# Patient Record
Sex: FEMALE | Race: WHITE | NOT HISPANIC OR LATINO | Employment: FULL TIME | URBAN - METROPOLITAN AREA
[De-identification: names, ages, dates, MRNs, and addresses within clinical notes are randomized per-mention and may not be internally consistent; named-entity substitution may affect disease eponyms.]

---

## 2023-11-10 ENCOUNTER — APPOINTMENT (OUTPATIENT)
Dept: CARDIOLOGY | Facility: HOSPITAL | Age: 68
End: 2023-11-10
Payer: COMMERCIAL

## 2023-11-10 ENCOUNTER — HOSPITAL ENCOUNTER (EMERGENCY)
Facility: HOSPITAL | Age: 68
Discharge: HOME | End: 2023-11-10
Attending: EMERGENCY MEDICINE
Payer: COMMERCIAL

## 2023-11-10 VITALS
SYSTOLIC BLOOD PRESSURE: 125 MMHG | DIASTOLIC BLOOD PRESSURE: 69 MMHG | OXYGEN SATURATION: 98 % | TEMPERATURE: 98.6 F | HEIGHT: 62 IN | BODY MASS INDEX: 36.07 KG/M2 | WEIGHT: 196 LBS | RESPIRATION RATE: 16 BRPM | HEART RATE: 74 BPM

## 2023-11-10 DIAGNOSIS — M79.89 LEFT LEG SWELLING: ICD-10-CM

## 2023-11-10 DIAGNOSIS — Z98.890 POST-OPERATIVE STATE: Primary | ICD-10-CM

## 2023-11-10 LAB
ANION GAP SERPL CALCULATED.3IONS-SCNC: 11.2 MMOL/L (ref 5–15)
BASOPHILS # BLD AUTO: 0.04 10*3/MM3 (ref 0–0.2)
BASOPHILS NFR BLD AUTO: 0.5 % (ref 0–1.5)
BUN SERPL-MCNC: 20 MG/DL (ref 8–23)
BUN/CREAT SERPL: 16.3 (ref 7–25)
CALCIUM SPEC-SCNC: 9.5 MG/DL (ref 8.6–10.5)
CHLORIDE SERPL-SCNC: 104 MMOL/L (ref 98–107)
CO2 SERPL-SCNC: 22.8 MMOL/L (ref 22–29)
CREAT SERPL-MCNC: 1.23 MG/DL (ref 0.57–1)
CRP SERPL-MCNC: 6.66 MG/DL (ref 0–0.5)
D-LACTATE SERPL-SCNC: 0.9 MMOL/L (ref 0.5–2)
DEPRECATED RDW RBC AUTO: 41 FL (ref 37–54)
EGFRCR SERPLBLD CKD-EPI 2021: 48.9 ML/MIN/1.73
EOSINOPHIL # BLD AUTO: 0.35 10*3/MM3 (ref 0–0.4)
EOSINOPHIL NFR BLD AUTO: 4.3 % (ref 0.3–6.2)
ERYTHROCYTE [DISTWIDTH] IN BLOOD BY AUTOMATED COUNT: 13 % (ref 12.3–15.4)
ERYTHROCYTE [SEDIMENTATION RATE] IN BLOOD: 65 MM/HR (ref 0–30)
GLUCOSE SERPL-MCNC: 93 MG/DL (ref 65–99)
HCT VFR BLD AUTO: 34.2 % (ref 34–46.6)
HGB BLD-MCNC: 11.5 G/DL (ref 12–15.9)
IMM GRANULOCYTES # BLD AUTO: 0.03 10*3/MM3 (ref 0–0.05)
IMM GRANULOCYTES NFR BLD AUTO: 0.4 % (ref 0–0.5)
LYMPHOCYTES # BLD AUTO: 1.78 10*3/MM3 (ref 0.7–3.1)
LYMPHOCYTES NFR BLD AUTO: 21.9 % (ref 19.6–45.3)
MCH RBC QN AUTO: 29.3 PG (ref 26.6–33)
MCHC RBC AUTO-ENTMCNC: 33.6 G/DL (ref 31.5–35.7)
MCV RBC AUTO: 87 FL (ref 79–97)
MONOCYTES # BLD AUTO: 0.8 10*3/MM3 (ref 0.1–0.9)
MONOCYTES NFR BLD AUTO: 9.8 % (ref 5–12)
NEUTROPHILS NFR BLD AUTO: 5.14 10*3/MM3 (ref 1.7–7)
NEUTROPHILS NFR BLD AUTO: 63.1 % (ref 42.7–76)
NRBC BLD AUTO-RTO: 0 /100 WBC (ref 0–0.2)
PLATELET # BLD AUTO: 261 10*3/MM3 (ref 140–450)
PMV BLD AUTO: 10.2 FL (ref 6–12)
POTASSIUM SERPL-SCNC: 4.7 MMOL/L (ref 3.5–5.2)
RBC # BLD AUTO: 3.93 10*6/MM3 (ref 3.77–5.28)
SODIUM SERPL-SCNC: 138 MMOL/L (ref 136–145)
WBC NRBC COR # BLD: 8.14 10*3/MM3 (ref 3.4–10.8)

## 2023-11-10 PROCEDURE — 85025 COMPLETE CBC W/AUTO DIFF WBC: CPT | Performed by: EMERGENCY MEDICINE

## 2023-11-10 PROCEDURE — 85652 RBC SED RATE AUTOMATED: CPT | Performed by: EMERGENCY MEDICINE

## 2023-11-10 PROCEDURE — 86140 C-REACTIVE PROTEIN: CPT | Performed by: EMERGENCY MEDICINE

## 2023-11-10 PROCEDURE — 83605 ASSAY OF LACTIC ACID: CPT | Performed by: EMERGENCY MEDICINE

## 2023-11-10 PROCEDURE — 87040 BLOOD CULTURE FOR BACTERIA: CPT | Performed by: EMERGENCY MEDICINE

## 2023-11-10 PROCEDURE — 93971 EXTREMITY STUDY: CPT

## 2023-11-10 PROCEDURE — 80048 BASIC METABOLIC PNL TOTAL CA: CPT | Performed by: EMERGENCY MEDICINE

## 2023-11-10 PROCEDURE — 99284 EMERGENCY DEPT VISIT MOD MDM: CPT

## 2023-11-10 PROCEDURE — 36415 COLL VENOUS BLD VENIPUNCTURE: CPT

## 2023-11-10 NOTE — ED NOTES
Pt had L hip replacement on Monday. Pt reports redness and swelling to leg. States she was advised to come to Er for further evaluation.

## 2023-11-11 LAB
BH CV LOWER VASCULAR LEFT COMMON FEMORAL AUGMENT: NORMAL
BH CV LOWER VASCULAR LEFT COMMON FEMORAL COMPETENT: NORMAL
BH CV LOWER VASCULAR LEFT COMMON FEMORAL COMPRESS: NORMAL
BH CV LOWER VASCULAR LEFT COMMON FEMORAL PHASIC: NORMAL
BH CV LOWER VASCULAR LEFT COMMON FEMORAL SPONT: NORMAL
BH CV LOWER VASCULAR LEFT GASTRONEMIUS COMPRESS: NORMAL
BH CV LOWER VASCULAR LEFT GREATER SAPH AK COMPRESS: NORMAL
BH CV LOWER VASCULAR LEFT GREATER SAPH BK COMPRESS: NORMAL
BH CV LOWER VASCULAR LEFT LESSER SAPH COMPRESS: NORMAL
BH CV LOWER VASCULAR LEFT MID FEMORAL AUGMENT: NORMAL
BH CV LOWER VASCULAR LEFT MID FEMORAL COMPETENT: NORMAL
BH CV LOWER VASCULAR LEFT MID FEMORAL COMPRESS: NORMAL
BH CV LOWER VASCULAR LEFT MID FEMORAL PHASIC: NORMAL
BH CV LOWER VASCULAR LEFT MID FEMORAL SPONT: NORMAL
BH CV LOWER VASCULAR LEFT PERONEAL COMPRESS: NORMAL
BH CV LOWER VASCULAR LEFT POPLITEAL AUGMENT: NORMAL
BH CV LOWER VASCULAR LEFT POPLITEAL COMPETENT: NORMAL
BH CV LOWER VASCULAR LEFT POPLITEAL COMPRESS: NORMAL
BH CV LOWER VASCULAR LEFT POPLITEAL PHASIC: NORMAL
BH CV LOWER VASCULAR LEFT POPLITEAL SPONT: NORMAL
BH CV LOWER VASCULAR LEFT POSTERIOR TIBIAL COMPRESS: NORMAL
BH CV LOWER VASCULAR LEFT PROFUNDA FEMORAL COMPRESS: NORMAL
BH CV LOWER VASCULAR LEFT PROXIMAL FEMORAL COMPRESS: NORMAL
BH CV LOWER VASCULAR LEFT SAPHENOFEMORAL JUNCTION COMPRESS: NORMAL
BH CV LOWER VASCULAR RIGHT COMMON FEMORAL AUGMENT: NORMAL
BH CV LOWER VASCULAR RIGHT COMMON FEMORAL COMPETENT: NORMAL
BH CV LOWER VASCULAR RIGHT COMMON FEMORAL COMPRESS: NORMAL
BH CV LOWER VASCULAR RIGHT COMMON FEMORAL PHASIC: NORMAL
BH CV LOWER VASCULAR RIGHT COMMON FEMORAL SPONT: NORMAL
BH CV VAS PRELIMINARY FINDINGS SCRIPTING: 1

## 2023-11-11 NOTE — ED PROVIDER NOTES
EMERGENCY DEPARTMENT ENCOUNTER    Room Number:  17/17  PCP: Susu Cr APRN  Patient Care Team:  Susu Cr APRN as PCP - General (Nurse Practitioner)   Independent Historians: Patient, family    HPI:  Chief Complaint: Left leg swelling    A complete HPI/ROS/PMH/PSH/SH/FH are unobtainable due to: Nothing    Chronic or social conditions impacting patient care (Social Determinants of Health): None  (Financial Resource Strain / Food Insecurity / Transportation Needs / Physical Activity / Stress / Social Connections / Intimate Partner Violence / Housing Stability)    Context: Aurelia Rhodes is a 67 y.o. female who presents to the ED c/o acute left leg swelling.  The patient reports that on Monday she had left hip surgery by Dr. Hirsch in [facility].  She reports that yesterday and today she was more active than she has been.  She reports that today her left leg started swelling.  The visiting home nurse directed her to the emergency room for further evaluation due to concern about a possible blood clot.  The patient reports she is on aspirin as well as doxycycline.  She was placed on doxycycline at the time of surgery.  She denies any chest pain or shortness of breath.  She denies any falls or injury.    Review of prior external notes (non-ED) -and- Review of prior external test results outside of this encounter: Orthopedic note dated 10/18/2023 notes left groin and hip pain that started about 6 months ago.  Diagnosed with left hip osteoarthritis    Prescription drug monitoring program review:         PAST MEDICAL HISTORY  Active Ambulatory Problems     Diagnosis Date Noted    No Active Ambulatory Problems     Resolved Ambulatory Problems     Diagnosis Date Noted    No Resolved Ambulatory Problems     No Additional Past Medical History         PAST SURGICAL HISTORY  No past surgical history on file.      FAMILY HISTORY  No family history on file.      SOCIAL HISTORY  Social  History     Socioeconomic History    Marital status:          ALLERGIES  Amoxicillin        REVIEW OF SYSTEMS  Review of Systems  Included in HPI  All systems reviewed and negative except for those discussed in HPI.      PHYSICAL EXAM    I have reviewed the triage vital signs and nursing notes.    ED Triage Vitals   Temp Heart Rate Resp BP SpO2   11/10/23 1709 11/10/23 1709 11/10/23 1709 11/10/23 1719 11/10/23 1709   98.6 °F (37 °C) 76 16 139/69 99 %      Temp src Heart Rate Source Patient Position BP Location FiO2 (%)   11/10/23 1709 11/10/23 1709 11/10/23 1719 11/10/23 1719 --   Tympanic Monitor Sitting Left arm        Physical Exam  GENERAL: Awake, alert, no acute distress  SKIN: Warm, dry  HENT: Normocephalic, atraumatic  EYES: no scleral icterus  CV: regular rhythm, regular rate  RESPIRATORY: normal effort, lungs clear  ABDOMEN: soft, nontender, nondistended  MUSCULOSKELETAL: no deformity.  Left hip incision is clean and, dry and intact.  Her left medial thigh has an area of erythema and induration without fluid collection.  Her left lower extremity is swollen compared to the right and is mildly erythematous and warm.  NEURO: alert, moves all extremities, follows commands                                                               LAB RESULTS  Recent Results (from the past 24 hour(s))   Duplex Venous Lower Extremity - Left    Collection Time: 11/10/23  7:07 PM   Result Value Ref Range    BH CV VAS PRELIMINARY FINDINGS SCRIPTING 1.0     Right Common Femoral Spont Y     Right Common Femoral Competent Y     Right Common Femoral Phasic Y     Right Common Femoral Compress C     Right Common Femoral Augment Y     Left Common Femoral Spont Y     Left Common Femoral Competent Y     Left Common Femoral Phasic Y     Left Common Femoral Compress C     Left Common Femoral Augment Y     Left Saphenofemoral Junction Compress C     Left Profunda Femoral Compress C     Left Proximal Femoral Compress C     Left Mid  Femoral Spont Y     Left Mid Femoral Competent Y     Left Mid Femoral Phasic Y     Left Mid Femoral Compress C     Left Mid Femoral Augment Y     Left Popliteal Spont Y     Left Popliteal Competent Y     Left Popliteal Phasic Y     Left Popliteal Compress C     Left Popliteal Augment Y     Left Posterior Tibial Compress C     Left Peroneal Compress C     Left Gastronemius Compress C     Left Greater Saph AK Compress C     Left Greater Saph BK Compress C     Left Lesser Saph Compress C    Basic Metabolic Panel    Collection Time: 11/10/23  8:01 PM    Specimen: Arm, Left; Blood   Result Value Ref Range    Glucose 93 65 - 99 mg/dL    BUN 20 8 - 23 mg/dL    Creatinine 1.23 (H) 0.57 - 1.00 mg/dL    Sodium 138 136 - 145 mmol/L    Potassium 4.7 3.5 - 5.2 mmol/L    Chloride 104 98 - 107 mmol/L    CO2 22.8 22.0 - 29.0 mmol/L    Calcium 9.5 8.6 - 10.5 mg/dL    BUN/Creatinine Ratio 16.3 7.0 - 25.0    Anion Gap 11.2 5.0 - 15.0 mmol/L    eGFR 48.9 (L) >60.0 mL/min/1.73   CBC Auto Differential    Collection Time: 11/10/23  8:01 PM    Specimen: Arm, Left; Blood   Result Value Ref Range    WBC 8.14 3.40 - 10.80 10*3/mm3    RBC 3.93 3.77 - 5.28 10*6/mm3    Hemoglobin 11.5 (L) 12.0 - 15.9 g/dL    Hematocrit 34.2 34.0 - 46.6 %    MCV 87.0 79.0 - 97.0 fL    MCH 29.3 26.6 - 33.0 pg    MCHC 33.6 31.5 - 35.7 g/dL    RDW 13.0 12.3 - 15.4 %    RDW-SD 41.0 37.0 - 54.0 fl    MPV 10.2 6.0 - 12.0 fL    Platelets 261 140 - 450 10*3/mm3    Neutrophil % 63.1 42.7 - 76.0 %    Lymphocyte % 21.9 19.6 - 45.3 %    Monocyte % 9.8 5.0 - 12.0 %    Eosinophil % 4.3 0.3 - 6.2 %    Basophil % 0.5 0.0 - 1.5 %    Immature Grans % 0.4 0.0 - 0.5 %    Neutrophils, Absolute 5.14 1.70 - 7.00 10*3/mm3    Lymphocytes, Absolute 1.78 0.70 - 3.10 10*3/mm3    Monocytes, Absolute 0.80 0.10 - 0.90 10*3/mm3    Eosinophils, Absolute 0.35 0.00 - 0.40 10*3/mm3    Basophils, Absolute 0.04 0.00 - 0.20 10*3/mm3    Immature Grans, Absolute 0.03 0.00 - 0.05 10*3/mm3    nRBC 0.0 0.0  - 0.2 /100 WBC   C-reactive Protein    Collection Time: 11/10/23  8:01 PM    Specimen: Arm, Left; Blood   Result Value Ref Range    C-Reactive Protein 6.66 (H) 0.00 - 0.50 mg/dL   Sedimentation Rate    Collection Time: 11/10/23  8:01 PM    Specimen: Arm, Left; Blood   Result Value Ref Range    Sed Rate 65 (H) 0 - 30 mm/hr   Lactic Acid, Plasma    Collection Time: 11/10/23  8:01 PM    Specimen: Arm, Left; Blood   Result Value Ref Range    Lactate 0.9 0.5 - 2.0 mmol/L       Ordered the above labs and independently reviewed the results.        RADIOLOGY  No Radiology Exams Resulted Within Past 24 Hours    I ordered the above noted radiological studies. Reviewed by me and discussed with radiologist.  See dictation for official radiology interpretation.      PROCEDURES    Procedures      MEDICATIONS GIVEN IN ER    Medications - No data to display      ORDERS PLACED DURING THIS VISIT:  Orders Placed This Encounter   Procedures    Blood Culture - Blood,    Blood Culture - Blood,    Basic Metabolic Panel    CBC Auto Differential    C-reactive Protein    Sedimentation Rate    Lactic Acid, Plasma    Ortho (on-call MD unless specified)    CBC & Differential         PROGRESS, DATA ANALYSIS, CONSULTS, AND MEDICAL DECISION MAKING    All labs have been independently interpreted by me.  All radiology studies have been reviewed by me and discussed with radiologist dictating the report.   EKG's independently viewed and interpreted by me.  Discussion below represents my analysis of pertinent findings related to patient's condition, differential diagnosis, treatment plan and final disposition.    Differential diagnosis includes but is not limited to DVT, cellulitis, sepsis, dependent edema, postoperative swelling.    ED Course as of 11/10/23 2057   Fri Nov 10, 2023   1840 First look: Patient had a left hip replacement 4 days ago.  She has been on aspirin since then.  She began having swelling in her left leg earlier today.  She reports  that her left leg feels tight.  She denies chest pain, shortness of breath, palpitations, syncope, or numbness/tingling in her toes.  Plan is to obtain labs and Doppler ultrasound of the left leg.  Patient's care will be assumed by the oncoming physician. [WH]   1914 Discussing with Nayeli with vascular.  Ultrasound is negative for DVT. [TR]   2026 The patient has no DVT on ultrasound.  Plan to obtain laboratory evaluation to help identify whether the the erythema is related to infection or postoperative swelling due to her overexerting yourself yesterday and today. [TR]   2046 The patient has an elevated CRP and sed rate but a normal lactic acid and normal white blood cell count.  Her orthopedic surgeon does not come to this facility but I have a call out to him to discuss recommendations and plan going forward. [TR]   2048 I reviewed the work-up and findings with the patient and family at the bedside. [TR]   2055 Discussing with JOHANN Rob with Dr. Tellez.  I reviewed lab work and ultrasound.  He is okay with the patient going home.  He wants the patient to call the office on Monday for an appointment to be seen.  He does not feel that the presentation represents cellulitis. [TR]   2057 I reviewed work-up and findings with the patient and family at the bedside.  Answered all questions.  Plan discharge home with follow-up on Monday with orthopedics.  They are agreeable. [TR]      ED Course User Index  [TR] Rickie Estrada MD  [WH] Donald Weber MD                  AS OF 20:57 EST VITALS:    BP - 139/69  HR - 76  TEMP - 98.6 °F (37 °C) (Tympanic)  O2 SATS - 99%        DIAGNOSIS  Final diagnoses:   Post-operative state   Left leg swelling         DISPOSITION  ED Disposition       ED Disposition   Discharge    Condition   Stable    Comment   --                  Note Disclaimer: At [facility], we believe that sharing information builds trust and better relationships. You are receiving this  note because you recently visited [facility]. It is possible you will see health information before a provider has talked with you about it. This kind of information can be easy to misunderstand. To help you fully understand what it means for your health, we urge you to discuss this note with your provider.         Rickie Estrada MD  11/10/23 2057

## 2023-11-11 NOTE — DISCHARGE INSTRUCTIONS
Keep your left leg elevated is much as possible.  Call Dr. Tellez' office on Monday for an appointment to be seen.  Return immediately for any fever over 100.4, chest pain, shortness of breath, or drainage from the wound.

## 2023-11-15 LAB
BACTERIA SPEC AEROBE CULT: NORMAL
BACTERIA SPEC AEROBE CULT: NORMAL

## 2024-06-19 ENCOUNTER — TELEPHONE (OUTPATIENT)
Dept: NEUROLOGY | Facility: CLINIC | Age: 69
End: 2024-06-19

## 2024-06-19 NOTE — TELEPHONE ENCOUNTER
Provider: СЕРГЕЙ    Caller: PATIENT    Relationship to Patient: SELF    Phone Number: [phone]    Reason for Call: PT CALLING TO SCHEDULE APPT FROM REFERRAL.  PLEASE CALL PT TO SCHEDULE    THANK YOU

## 2024-06-20 ENCOUNTER — TELEPHONE (OUTPATIENT)
Dept: NEUROLOGY | Facility: CLINIC | Age: 69
End: 2024-06-20
Payer: COMMERCIAL

## 2024-06-20 NOTE — TELEPHONE ENCOUNTER
Spoke to pt in regards to setting up new patient appt  with referral from carmita to are[facility] (Due to [facility] 10 day policy for pt referrals) , patient had stated Carmita wanted a Nerve test done, Just needed to make sure if pt needed to be set up with Randall for EMG test or [facility]tha for the consultation first. Had set up pt with [facility] on Sept 9th to hold spot for patient. (Batsheva was out, stated to schedule if refferal was approved)     Please advise on next steps.

## 2024-09-09 ENCOUNTER — OFFICE VISIT (OUTPATIENT)
Dept: NEUROLOGY | Facility: CLINIC | Age: 69
End: 2024-09-09
Payer: COMMERCIAL

## 2024-09-09 VITALS
DIASTOLIC BLOOD PRESSURE: 76 MMHG | HEART RATE: 81 BPM | HEIGHT: 62 IN | BODY MASS INDEX: 38.64 KG/M2 | WEIGHT: 210 LBS | OXYGEN SATURATION: 98 % | SYSTOLIC BLOOD PRESSURE: 140 MMHG

## 2024-09-09 DIAGNOSIS — G57.93 NEUROPATHY OF BOTH FEET: ICD-10-CM

## 2024-09-09 DIAGNOSIS — E11.40 TYPE 2 DIABETES MELLITUS WITH DIABETIC NEUROPATHY, WITHOUT LONG-TERM CURRENT USE OF INSULIN: ICD-10-CM

## 2024-09-09 DIAGNOSIS — R76.8 ANA POSITIVE: ICD-10-CM

## 2024-09-09 DIAGNOSIS — G62.9 NEUROPATHY: Primary | ICD-10-CM

## 2024-09-09 PROCEDURE — 1159F MED LIST DOCD IN RCRD: CPT | Performed by: STUDENT IN AN ORGANIZED HEALTH CARE EDUCATION/TRAINING PROGRAM

## 2024-09-09 PROCEDURE — 99204 OFFICE O/P NEW MOD 45 MIN: CPT | Performed by: STUDENT IN AN ORGANIZED HEALTH CARE EDUCATION/TRAINING PROGRAM

## 2024-09-09 PROCEDURE — 1160F RVW MEDS BY RX/DR IN RCRD: CPT | Performed by: STUDENT IN AN ORGANIZED HEALTH CARE EDUCATION/TRAINING PROGRAM

## 2024-09-09 RX ORDER — PREGABALIN 50 MG/1
50 CAPSULE ORAL NIGHTLY
Qty: 90 CAPSULE | Refills: 1 | Status: SHIPPED | OUTPATIENT
Start: 2024-09-09 | End: 2025-03-08

## 2024-09-09 NOTE — PROGRESS NOTES
Chief Complaint   Patient presents with    Peripheral Neuropathy       Patient ID: Aurelia Rhodes is a 68 y.o. female.    HPI:    The following portions of the patient's history were reviewed and updated as appropriate: allergies, current medications, past family history, past medical history, past social history, past surgical history and problem list.    Review of Systems   Neurological:  Positive for numbness (legs and feet). Negative for dizziness, tremors, seizures, syncope, facial asymmetry, speech difficulty, weakness, light-headedness and headaches.   Psychiatric/Behavioral:  Positive for sleep disturbance. Negative for agitation, behavioral problems, confusion, decreased concentration, dysphoric mood, hallucinations, self-injury and suicidal ideas. The patient is not nervous/anxious and is not hyperactive.       History of Present Illness  The patient is a 68-year-old female referred by Dr. Wellington Seymour to the neurology clinic in 05/2024 for peripheral neuropathy.    She reports experiencing numbness and tingling, which have been present for at least 2 years and have progressively worsened. The numbness initially started in one leg before spreading to both legs and feet. While she also experiences some tingling, the numbness is more prominent. Her symptoms intensify when she lies down to sleep. She describes a sensation of intense heat in the arch of her right foot, particularly at night, which is severe enough to wake her up. She finds relief by rolling a frozen bottle over it. Additionally, she experiences a mild burning sensation in her left foot, but the discomfort is primarily in her right arch. Despite these symptoms, she remains active and walks throughout the day. She has not had any falls since her hip replacement surgery on 11/06/2023. She reports no pain in her legs but admits to having poor balance.    She has diabetes, which she has been managing with glipizide for about 5 years. She recalls her  blood pressure spiking to 187 after she stopped taking metformin, but it has since returned to normal levels. Her last recorded hemoglobin A1c level was 5.9. She has not had her blood sugar levels checked recently. She has chronic renal insufficiency and has been experiencing kidney issues for the past year, which she believes are related to her diabetes. She consulted a nephrologist last year but has not done so since her surgery. She started taking vitamin B12 supplements a week ago and also takes other vitamins, although not on a daily basis. She occasionally consumes alcohol during special occasions or when dining out, approximately every 4 to 5 months.    She does not have any known heart conditions. She does not have difficulty falling asleep, but her sleep is frequently interrupted due to frequent urination caused by her diabetes.    Supplemental Information:  She had a lot of dental work done and had an implant this year.    FAMILY HISTORY  She is not aware of any family history of neuropathy. Her brother may have a little bit of neuropathy, but he also has kidney problems.      Vitals:    09/09/24 1530   BP: 140/76   Pulse: 81   SpO2: 98%       Neurologic Exam     Mental Status   Speech: speech is normal   Level of consciousness: alert    Cranial Nerves     CN II   Visual fields full to confrontation.     CN III, IV, VI   Pupils are equal, round, and reactive to light.  Extraocular motions are normal.     CN V   Facial sensation intact.     CN VII   Facial expression full, symmetric.     CN VIII   Hearing: intact    CN IX, X   Palate: symmetric    CN XI   Right trapezius strength: normal  Left trapezius strength: normal    CN XII   Tongue: not atrophic  Fasciculations: absent  Tongue deviation: none    Motor Exam   Muscle bulk: normal    Strength   Right deltoid: 5/5  Left deltoid: 5/5  Right biceps: 5/5  Left biceps: 5/5  Right triceps: 5/5  Left triceps: 5/5  Right iliopsoas: 5/5  Left iliopsoas:  5/5  Right quadriceps: 5/5  Left quadriceps: 5/5  Right hamstrin/5  Left hamstrin/5  Right anterior tibial: 5/5  Left anterior tibial: 5/5  Right gastroc: 5/5  Left gastroc: 5/5Grip 5 out of 5 bilaterally     Sensory Exam   Right arm light touch: normal  Left arm light touch: normal  Right leg light touch: normal  Left leg light touch: normal  Right leg proprioception: decreased from toes  Left leg proprioception: decreased from toes  Decr vibration left toes, absent vibration toes, normal vibration ankles, knees, fingers     Gait, Coordination, and Reflexes     Coordination   Romberg: negative  Finger to nose coordination: normal  Heel to shin coordination: normal  Tandem walking coordination: abnormal    Reflexes   Right brachioradialis: 2+  Left brachioradialis: 2+  Right biceps: 2+  Left biceps: 2+  Right patellar: 2+  Left patellar: 2+  Right achilles: 2+  Left achilles: 0      Physical Exam  Eyes:      Extraocular Movements: EOM normal.      Pupils: Pupils are equal, round, and reactive to light.   Neurological:      Coordination: Finger-Nose-Finger Test, Heel to Shin Test and Romberg Test normal.      Gait: Tandem walk abnormal.      Deep Tendon Reflexes:      Reflex Scores:       Bicep reflexes are 2+ on the right side and 2+ on the left side.       Brachioradialis reflexes are 2+ on the right side and 2+ on the left side.       Patellar reflexes are 2+ on the right side and 2+ on the left side.       Achilles reflexes are 2+ on the right side and 0 on the left side.  Psychiatric:         Speech: Speech normal.       Procedures    Assessment/Plan:    Assessment & Plan  1. Peripheral neuropathy.  Her symptoms are indicative of peripheral neuropathy, potentially due to her diabetes and prediabetes, as suggested by her current A1c levels. Kidney disease, another condition she has, is also a significant risk factor for nerve damage. Despite her well-managed diabetes, her neuropathy symptoms have  worsened. It was explained that in up to 40% of cases, a specific cause for nerve damage may not be identified. However, further investigation into potential causes will continue with labs as below. She was informed that while there is no cure for numbness, maintaining physical activity can help improve circulation, lower blood sugar, and enhance balance. The primary concerns are pain and the risk of injury leading to infection, which she may not notice until it becomes severe. However, these issues are still in the early stages. A comprehensive set of labs will be ordered to identify any other treatable causes of nerve issues, including checking for vitamin deficiencies, signs of inflammation, and abnormal protein production. She is advised to continue managing her diabetes effectively. A prescription for pregabalin 50 mg, to be taken nightly, will be provided to help manage her nerve pain. The potential side effects of this medication, including tiredness, dizziness, suicidal mood, weight gain, limb swelling, and dry eye, were discussed.         Follow-up  The patient will follow up in approximately 10 weeks.   I spent 48  minutes caring for this patient on this date of service. This time includes time spent by me in the following activities as necessary: preparing for the visit, reviewing tests, medical records and previous visits, obtaining and/or reviewing a separately obtained history, performing a medically appropriate exam and/or evaluation, counseling and educating the patient, and/or communicating with other healthcare professionals, documenting information in the medical record, independently interpreting results and communicating that information with the patient, and developing a medically appropriate treatment plan with consideration of other conditions, medications, and treatments.    Patient or patient representative verbalized consent for the use of Ambient Listening during the visit with  Conner  MD Isaiah for chart documentation. 9/30/2024  00:23 EDT     Diagnoses and all orders for this visit:    1. Neuropathy (Primary)  -     Cryoglobulin  -     Hemoglobin A1c; Future  -     TSH Rfx On Abnormal To Free T4  -     Vitamin B1, Whole Blood  -     Vitamin E  -     Vitamin B6  -     Copper, Serum  -     Zinc; Future  -     WENDI  -     ANCA Panel  -     Sjogren's Antibody, Anti-SS-A / -SS-B; Future  -     Rheumatoid Factor  -     DAMIAN + PE; Future  -     Vitamin B12  -     Folate; Future  -     pregabalin (LYRICA) 50 MG capsule; Take 1 capsule by mouth Every Night for 180 days.  Dispense: 90 capsule; Refill: 1  -     REFLEXED DNA/DS    2. Type 2 diabetes mellitus with diabetic neuropathy, without long-term current use of insulin  -     Hemoglobin A1c; Future    3. Neuropathy of both feet  -     TSH Rfx On Abnormal To Free T4           Conner Colon MD

## 2024-09-09 NOTE — LETTER
September 30, 2024       No Recipients    Patient: Aurelia Rhodes   YOB: 1955   Date of Visit: 9/9/2024     Dear Wellington Seymour MD:       Thank you for referring Aurelia Rhodes to me for evaluation. Below are the relevant portions of my assessment and plan of care.    If you have questions, please do not hesitate to call me. I look forward to following Aurelia along with you.         Sincerely,        Conner Colon MD        CC:   No Recipients    Conner Colon MD  09/30/24 0024  Sign when Signing Visit  Chief Complaint   Patient presents with   • Peripheral Neuropathy       Patient ID: Aurelia Rhodes is a 68 y.o. female.    HPI:    The following portions of the patient's history were reviewed and updated as appropriate: allergies, current medications, past family history, past medical history, past social history, past surgical history and problem list.    Review of Systems   Neurological:  Positive for numbness (legs and feet). Negative for dizziness, tremors, seizures, syncope, facial asymmetry, speech difficulty, weakness, light-headedness and headaches.   Psychiatric/Behavioral:  Positive for sleep disturbance. Negative for agitation, behavioral problems, confusion, decreased concentration, dysphoric mood, hallucinations, self-injury and suicidal ideas. The patient is not nervous/anxious and is not hyperactive.       History of Present Illness  The patient is a 68-year-old female referred by Dr. Wellington Seymour to the neurology clinic in 05/2024 for peripheral neuropathy.    She reports experiencing numbness and tingling, which have been present for at least 2 years and have progressively worsened. The numbness initially started in one leg before spreading to both legs and feet. While she also experiences some tingling, the numbness is more prominent. Her symptoms intensify when she lies down to sleep. She describes a sensation of intense heat in the arch of her right foot, particularly at night, which is severe  enough to wake her up. She finds relief by rolling a frozen bottle over it. Additionally, she experiences a mild burning sensation in her left foot, but the discomfort is primarily in her right arch. Despite these symptoms, she remains active and walks throughout the day. She has not had any falls since her hip replacement surgery on 11/06/2023. She reports no pain in her legs but admits to having poor balance.    She has diabetes, which she has been managing with glipizide for about 5 years. She recalls her blood pressure spiking to 187 after she stopped taking metformin, but it has since returned to normal levels. Her last recorded hemoglobin A1c level was 5.9. She has not had her blood sugar levels checked recently. She has chronic renal insufficiency and has been experiencing kidney issues for the past year, which she believes are related to her diabetes. She consulted a nephrologist last year but has not done so since her surgery. She started taking vitamin B12 supplements a week ago and also takes other vitamins, although not on a daily basis. She occasionally consumes alcohol during special occasions or when dining out, approximately every 4 to 5 months.    She does not have any known heart conditions. She does not have difficulty falling asleep, but her sleep is frequently interrupted due to frequent urination caused by her diabetes.    Supplemental Information:  She had a lot of dental work done and had an implant this year.    FAMILY HISTORY  She is not aware of any family history of neuropathy. Her brother may have a little bit of neuropathy, but he also has kidney problems.      Vitals:    09/09/24 1530   BP: 140/76   Pulse: 81   SpO2: 98%       Neurologic Exam     Mental Status   Speech: speech is normal   Level of consciousness: alert    Cranial Nerves     CN II   Visual fields full to confrontation.     CN III, IV, VI   Pupils are equal, round, and reactive to light.  Extraocular motions are normal.      CN V   Facial sensation intact.     CN VII   Facial expression full, symmetric.     CN VIII   Hearing: intact    CN IX, X   Palate: symmetric    CN XI   Right trapezius strength: normal  Left trapezius strength: normal    CN XII   Tongue: not atrophic  Fasciculations: absent  Tongue deviation: none    Motor Exam   Muscle bulk: normal    Strength   Right deltoid: 5/5  Left deltoid: 5/5  Right biceps: 5/5  Left biceps: 5/5  Right triceps: 5/5  Left triceps: 5/5  Right iliopsoas: 5/5  Left iliopsoas: 5/5  Right quadriceps: 5/5  Left quadriceps: 5/5  Right hamstrin/5  Left hamstrin/5  Right anterior tibial: 5/5  Left anterior tibial: 5/5  Right gastroc: 5/5  Left gastroc: 5/5Grip 5 out of 5 bilaterally     Sensory Exam   Right arm light touch: normal  Left arm light touch: normal  Right leg light touch: normal  Left leg light touch: normal  Right leg proprioception: decreased from toes  Left leg proprioception: decreased from toes  Decr vibration left toes, absent vibration toes, normal vibration ankles, knees, fingers     Gait, Coordination, and Reflexes     Coordination   Romberg: negative  Finger to nose coordination: normal  Heel to shin coordination: normal  Tandem walking coordination: abnormal    Reflexes   Right brachioradialis: 2+  Left brachioradialis: 2+  Right biceps: 2+  Left biceps: 2+  Right patellar: 2+  Left patellar: 2+  Right achilles: 2+  Left achilles: 0      Physical Exam  Eyes:      Extraocular Movements: EOM normal.      Pupils: Pupils are equal, round, and reactive to light.   Neurological:      Coordination: Finger-Nose-Finger Test, Heel to Shin Test and Romberg Test normal.      Gait: Tandem walk abnormal.      Deep Tendon Reflexes:      Reflex Scores:       Bicep reflexes are 2+ on the right side and 2+ on the left side.       Brachioradialis reflexes are 2+ on the right side and 2+ on the left side.       Patellar reflexes are 2+ on the right side and 2+ on the left side.        Achilles reflexes are 2+ on the right side and 0 on the left side.  Psychiatric:         Speech: Speech normal.       Procedures    Assessment/Plan:    Assessment & Plan  1. Peripheral neuropathy.  Her symptoms are indicative of peripheral neuropathy, potentially due to her diabetes and prediabetes, as suggested by her current A1c levels. Kidney disease, another condition she has, is also a significant risk factor for nerve damage. Despite her well-managed diabetes, her neuropathy symptoms have worsened. It was explained that in up to 40% of cases, a specific cause for nerve damage may not be identified. However, further investigation into potential causes will continue with labs as below. She was informed that while there is no cure for numbness, maintaining physical activity can help improve circulation, lower blood sugar, and enhance balance. The primary concerns are pain and the risk of injury leading to infection, which she may not notice until it becomes severe. However, these issues are still in the early stages. A comprehensive set of labs will be ordered to identify any other treatable causes of nerve issues, including checking for vitamin deficiencies, signs of inflammation, and abnormal protein production. She is advised to continue managing her diabetes effectively. A prescription for pregabalin 50 mg, to be taken nightly, will be provided to help manage her nerve pain. The potential side effects of this medication, including tiredness, dizziness, suicidal mood, weight gain, limb swelling, and dry eye, were discussed.         Follow-up  The patient will follow up in approximately 10 weeks.   I spent 48  minutes caring for this patient on this date of service. This time includes time spent by me in the following activities as necessary: preparing for the visit, reviewing tests, medical records and previous visits, obtaining and/or reviewing a separately obtained history, performing a medically appropriate  exam and/or evaluation, counseling and educating the patient, and/or communicating with other healthcare professionals, documenting information in the medical record, independently interpreting results and communicating that information with the patient, and developing a medically appropriate treatment plan with consideration of other conditions, medications, and treatments.    Patient or patient representative verbalized consent for the use of Ambient Listening during the visit with  Conner Colon MD for chart documentation. 9/30/2024  00:23 EDT     Diagnoses and all orders for this visit:    1. Neuropathy (Primary)  -     Cryoglobulin  -     Hemoglobin A1c; Future  -     TSH Rfx On Abnormal To Free T4  -     Vitamin B1, Whole Blood  -     Vitamin E  -     Vitamin B6  -     Copper, Serum  -     Zinc; Future  -     WENDI  -     ANCA Panel  -     Sjogren's Antibody, Anti-SS-A / -SS-B; Future  -     Rheumatoid Factor  -     DAMIAN + PE; Future  -     Vitamin B12  -     Folate; Future  -     pregabalin (LYRICA) 50 MG capsule; Take 1 capsule by mouth Every Night for 180 days.  Dispense: 90 capsule; Refill: 1  -     REFLEXED DNA/DS    2. Type 2 diabetes mellitus with diabetic neuropathy, without long-term current use of insulin  -     Hemoglobin A1c; Future    3. Neuropathy of both feet  -     TSH Rfx On Abnormal To Free T4           Conner Colon MD

## 2024-09-13 ENCOUNTER — LAB (OUTPATIENT)
Dept: LAB | Facility: HOSPITAL | Age: 69
End: 2024-09-13
Payer: MEDICARE

## 2024-09-13 ENCOUNTER — TELEPHONE (OUTPATIENT)
Dept: NEUROLOGY | Facility: CLINIC | Age: 69
End: 2024-09-13
Payer: MEDICARE

## 2024-09-13 DIAGNOSIS — E11.40 TYPE 2 DIABETES MELLITUS WITH DIABETIC NEUROPATHY, WITHOUT LONG-TERM CURRENT USE OF INSULIN: ICD-10-CM

## 2024-09-13 DIAGNOSIS — G62.9 NEUROPATHY: ICD-10-CM

## 2024-09-13 LAB
CHROMATIN AB SERPL-ACNC: <10 IU/ML (ref 0–14)
FOLATE SERPL-MCNC: 12.75 NG/ML (ref 4.78–24.2)
HBA1C MFR BLD: 5.6 % (ref 4.8–5.6)
TSH SERPL DL<=0.05 MIU/L-ACNC: 2.79 UIU/ML (ref 0.27–4.2)
VIT B12 BLD-MCNC: 958 PG/ML (ref 211–946)

## 2024-09-13 PROCEDURE — 83516 IMMUNOASSAY NONANTIBODY: CPT | Performed by: STUDENT IN AN ORGANIZED HEALTH CARE EDUCATION/TRAINING PROGRAM

## 2024-09-13 PROCEDURE — 82595 ASSAY OF CRYOGLOBULIN: CPT | Performed by: STUDENT IN AN ORGANIZED HEALTH CARE EDUCATION/TRAINING PROGRAM

## 2024-09-13 PROCEDURE — 83036 HEMOGLOBIN GLYCOSYLATED A1C: CPT

## 2024-09-13 PROCEDURE — 86038 ANTINUCLEAR ANTIBODIES: CPT | Performed by: STUDENT IN AN ORGANIZED HEALTH CARE EDUCATION/TRAINING PROGRAM

## 2024-09-13 PROCEDURE — 84155 ASSAY OF PROTEIN SERUM: CPT

## 2024-09-13 PROCEDURE — 84207 ASSAY OF VITAMIN B-6: CPT | Performed by: STUDENT IN AN ORGANIZED HEALTH CARE EDUCATION/TRAINING PROGRAM

## 2024-09-13 PROCEDURE — 86235 NUCLEAR ANTIGEN ANTIBODY: CPT

## 2024-09-13 PROCEDURE — 84165 PROTEIN E-PHORESIS SERUM: CPT

## 2024-09-13 PROCEDURE — 82525 ASSAY OF COPPER: CPT | Performed by: STUDENT IN AN ORGANIZED HEALTH CARE EDUCATION/TRAINING PROGRAM

## 2024-09-13 PROCEDURE — 82784 ASSAY IGA/IGD/IGG/IGM EACH: CPT

## 2024-09-13 PROCEDURE — 86431 RHEUMATOID FACTOR QUANT: CPT | Performed by: STUDENT IN AN ORGANIZED HEALTH CARE EDUCATION/TRAINING PROGRAM

## 2024-09-13 PROCEDURE — 84443 ASSAY THYROID STIM HORMONE: CPT | Performed by: STUDENT IN AN ORGANIZED HEALTH CARE EDUCATION/TRAINING PROGRAM

## 2024-09-13 PROCEDURE — 84630 ASSAY OF ZINC: CPT

## 2024-09-13 PROCEDURE — 84425 ASSAY OF VITAMIN B-1: CPT | Performed by: STUDENT IN AN ORGANIZED HEALTH CARE EDUCATION/TRAINING PROGRAM

## 2024-09-13 PROCEDURE — 86225 DNA ANTIBODY NATIVE: CPT | Performed by: STUDENT IN AN ORGANIZED HEALTH CARE EDUCATION/TRAINING PROGRAM

## 2024-09-13 PROCEDURE — 86037 ANCA TITER EACH ANTIBODY: CPT | Performed by: STUDENT IN AN ORGANIZED HEALTH CARE EDUCATION/TRAINING PROGRAM

## 2024-09-13 PROCEDURE — 82607 VITAMIN B-12: CPT | Performed by: STUDENT IN AN ORGANIZED HEALTH CARE EDUCATION/TRAINING PROGRAM

## 2024-09-13 PROCEDURE — 84446 ASSAY OF VITAMIN E: CPT | Performed by: STUDENT IN AN ORGANIZED HEALTH CARE EDUCATION/TRAINING PROGRAM

## 2024-09-13 PROCEDURE — 86334 IMMUNOFIX E-PHORESIS SERUM: CPT

## 2024-09-13 PROCEDURE — 82746 ASSAY OF FOLIC ACID SERUM: CPT

## 2024-09-13 PROCEDURE — 36415 COLL VENOUS BLD VENIPUNCTURE: CPT

## 2024-09-13 NOTE — TELEPHONE ENCOUNTER
Pt came in on 9/13 was unable to get labs drawn downstairs. I advised pt she can go to [facility] on [address]. Faxed orders over to [facility] for patient.

## 2024-09-14 LAB
ANA SER QL: POSITIVE
DSDNA AB SER-ACNC: 4 IU/ML (ref 0–9)
ENA SS-A AB SER-ACNC: >8 AI (ref 0–0.9)
ENA SS-B AB SER-ACNC: 1 AI (ref 0–0.9)
Lab: NORMAL

## 2024-09-16 LAB
ALBUMIN SERPL ELPH-MCNC: 3.8 G/DL (ref 2.9–4.4)
ALBUMIN/GLOB SERPL: 1.2 {RATIO} (ref 0.7–1.7)
ALPHA1 GLOB SERPL ELPH-MCNC: 0.3 G/DL (ref 0–0.4)
ALPHA2 GLOB SERPL ELPH-MCNC: 0.8 G/DL (ref 0.4–1)
B-GLOBULIN SERPL ELPH-MCNC: 1.1 G/DL (ref 0.7–1.3)
C-ANCA TITR SER IF: NORMAL TITER
GAMMA GLOB SERPL ELPH-MCNC: 1.2 G/DL (ref 0.4–1.8)
GLOBULIN SER-MCNC: 3.3 G/DL (ref 2.2–3.9)
IGA SERPL-MCNC: 498 MG/DL (ref 87–352)
IGG SERPL-MCNC: 1134 MG/DL (ref 586–1602)
IGM SERPL-MCNC: 46 MG/DL (ref 26–217)
INTERPRETATION SERPL IEP-IMP: ABNORMAL
LABORATORY COMMENT REPORT: ABNORMAL
M PROTEIN SERPL ELPH-MCNC: ABNORMAL G/DL
MYELOPEROXIDASE AB SER IA-ACNC: <0.2 UNITS (ref 0–0.9)
P-ANCA ATYPICAL TITR SER IF: NORMAL TITER
P-ANCA TITR SER IF: NORMAL TITER
PROT SERPL-MCNC: 7.1 G/DL (ref 6–8.5)
PROTEINASE3 AB SER IA-ACNC: <0.2 UNITS (ref 0–0.9)

## 2024-09-17 LAB
COPPER SERPL-MCNC: 116 UG/DL (ref 80–158)
PYRIDOXAL PHOS SERPL-MCNC: 37.9 UG/L (ref 3.4–65.2)
VIT B1 BLD-SCNC: 346.7 NMOL/L (ref 66.5–200)

## 2024-09-18 LAB — ZINC SERPL-MCNC: 65 UG/DL (ref 44–115)

## 2024-09-19 LAB — CRYOGLOB SER QL 1D COLD INC: NORMAL

## 2024-09-24 LAB
A-TOCOPHEROL VIT E SERPL-MCNC: 15.1 MG/L (ref 9–29)
GAMMA TOCOPHEROL SERPL-MCNC: 0.5 MG/L (ref 0.5–4.9)

## 2024-09-28 DIAGNOSIS — M35.06 SJOGREN SYNDROME WITH PERIPHERAL NERVOUS SYSTEM INVOLVEMENT: Primary | ICD-10-CM

## 2024-09-28 DIAGNOSIS — G62.9 NEUROPATHY: ICD-10-CM

## 2024-10-17 ENCOUNTER — TELEPHONE (OUTPATIENT)
Dept: NEUROLOGY | Facility: CLINIC | Age: 69
End: 2024-10-17
Payer: MEDICARE

## 2024-10-17 NOTE — TELEPHONE ENCOUNTER
Provider: СЕРГЕЙ    Caller: ELLIOT    Phone Number: [phone]     Reason for Call: PT CALLED AND STATES SHE HAS A ENDOCRINOLOGY APPT COMING UP THAT PT SCHEDULED ABOUT 6 MONTHS AGO AND PROVIDER HAS REFERRED PT TO RHEUMATOLOGY. PT IS WANTING O KNOW IF SHE IS NEEDING TO SEE BOTHER SPECIALTIES.    PLEASE REVIEW AND ADVISE  THANK YOU

## 2024-11-20 ENCOUNTER — OFFICE VISIT (OUTPATIENT)
Dept: NEUROLOGY | Facility: CLINIC | Age: 69
End: 2024-11-20
Payer: MEDICARE

## 2024-11-20 VITALS
DIASTOLIC BLOOD PRESSURE: 88 MMHG | HEIGHT: 62 IN | HEART RATE: 79 BPM | OXYGEN SATURATION: 100 % | SYSTOLIC BLOOD PRESSURE: 138 MMHG | BODY MASS INDEX: 39.2 KG/M2 | WEIGHT: 213 LBS

## 2024-11-20 DIAGNOSIS — G62.9 NEUROPATHY: ICD-10-CM

## 2024-11-20 PROCEDURE — 99214 OFFICE O/P EST MOD 30 MIN: CPT | Performed by: STUDENT IN AN ORGANIZED HEALTH CARE EDUCATION/TRAINING PROGRAM

## 2024-11-20 PROCEDURE — 1160F RVW MEDS BY RX/DR IN RCRD: CPT | Performed by: STUDENT IN AN ORGANIZED HEALTH CARE EDUCATION/TRAINING PROGRAM

## 2024-11-20 PROCEDURE — 1159F MED LIST DOCD IN RCRD: CPT | Performed by: STUDENT IN AN ORGANIZED HEALTH CARE EDUCATION/TRAINING PROGRAM

## 2024-11-20 RX ORDER — PREGABALIN 50 MG/1
50 CAPSULE ORAL NIGHTLY
Qty: 90 CAPSULE | Refills: 3 | Status: SHIPPED | OUTPATIENT
Start: 2024-11-20 | End: 2025-11-15

## 2024-11-20 NOTE — PROGRESS NOTES
Chief Complaint   Patient presents with    Peripheral Neuropathy       Patient ID: Aurelia Rhodes is a 68 y.o. female.    HPI:    The following portions of the patient's history were reviewed and updated as appropriate: allergies, current medications, past family history, past medical history, past social history, past surgical history and problem list.    Interval history:    Review of Systems   Neurological:  Positive for numbness (legs and feet). Negative for dizziness, tremors, seizures, syncope, facial asymmetry, speech difficulty, weakness, light-headedness and headaches.      History of Present Illness  The patient is a 68-year-old female who presents to the neurology clinic for a follow-up on peripheral neuropathy.    On her last visit, a variety of neuropathy workups were conducted. She had a positive Sjogren's antibody and an abnormal WENDI. Her B12 level was slightly high at 958. Protein electrophoresis showed elevated IgA, but no monoclonality was detected. Vitamin B1 was high at 346.7. Pregabalin 50 mg was prescribed to be taken nightly, as she mentioned the burning pain was worse at night.    She reports a burning sensation in the arch of her foot, which intensifies at night. The pregabalin has been beneficial in managing this sensation though she can still feel it. She takes it before bedtime and has not experienced any side effects. She has not had any falls since her last visit. Wants to keep things the same.    Additionally, she mentions occasional dry mouth and eyes. She has an upcoming appointment with Rheumatology on 03/10/2025.      Vitals:    11/20/24 0812   BP: 138/88   Pulse: 79   SpO2: 100%       Neurological Exam  Mental Status  Alert.    Cranial Nerves  CN II: Right normal visual field. Left normal visual field.  CN III, IV, VI: Extraocular movements intact bilaterally. Pupils equal round and reactive to light bilaterally.  CN V:  Right: Facial sensation is normal.  Left: Facial sensation is  normal on the left.  CN VII:  Left: There is no facial weakness.  CN IX, X:  Right: Palate is normal.  Left: Palate is normal.  CN XI:  Right: Trapezius strength is normal.  Left: Trapezius strength is normal.  CN XII: Tongue midline without atrophy or fasciculations.  Normal hearing to finger rub bilaterally.    Motor                                               Right                     Left  Deltoid                                   5                          5   Biceps                                   5                          5   Triceps                                  5                          5   Iliopsoas                               5                          5   Quadriceps                           5                          5   Hamstring                             5                          5   Gastrocnemius                     5                           5   Anterior tibialis                      5                          5    Sensory  Light touch is normal in upper and lower extremities.   Decr R ankle and L toes downwards to vibration   .    Reflexes                                            Right                      Left  Brachioradialis                    2+                         2+  Biceps                                 2+                         2+  Patellar                                2+                         2+  Achilles                                2+                         2+    Coordination  Right: Finger-to-nose normal. Heel-to-shin normal.Left: Finger-to-nose normal. Heel-to-shin normal.    Gait    Normal unstressed gait.      Physical Exam  Eyes:      Extraocular Movements: Extraocular movements intact.      Pupils: Pupils are equal, round, and reactive to light.   Neurological:      Mental Status: She is alert.      Deep Tendon Reflexes:      Reflex Scores:       Bicep reflexes are 2+ on the right side and 2+ on the left side.       Brachioradialis reflexes are 2+ on  the right side and 2+ on the left side.       Patellar reflexes are 2+ on the right side and 2+ on the left side.       Achilles reflexes are 2+ on the right side and 2+ on the left side.        Procedures    Assessment/Plan:    Assessment & Plan  1. Peripheral neuropathy.  Her blood sugar levels are within the normal range, which is beneficial for managing neuropathy. Previous lab results indicated a positive WENDI and Sjogren's antibody. She was advised to maintain her current dosage of pregabalin 50 mg nightly, but to adjust the timing to half an hour or an hour before bedtime to see if it helps further. She was also encouraged to monitor her weight and caloric intake, particularly during the holiday season, as pregabalin can sometimes cause weight gain. A refill of her pregabalin prescription was provided to ensure she has enough medication through the new year.    2. Sjogren's syndrome.  She has an appointment with a rheumatologist on March 10 to further evaluate and manage her condition.    Follow-up  Return in 6 months for follow-up.   I spent 30 minutes caring for this patient on this date of service. This time includes time spent by me in the following activities as necessary: preparing for the visit, reviewing tests, medical records and previous visits, obtaining and/or reviewing a separately obtained history, performing a medically appropriate exam and/or evaluation, counseling and educating the patient, and/or communicating with other healthcare professionals, documenting information in the medical record, independently interpreting results and communicating that information with the patient, and developing a medically appropriate treatment plan with consideration of other conditions, medications, and treatments.    Patient or patient representative verbalized consent for the use of Ambient Listening during the visit with  Conner Colon MD for chart documentation. 11/20/2024  09:11 EST     Diagnoses and all  orders for this visit:    1. Neuropathy  -     pregabalin (LYRICA) 50 MG capsule; Take 1 capsule by mouth Every Night for 360 days.  Dispense: 90 capsule; Refill: 3           Conner Colon MD

## 2025-05-21 ENCOUNTER — OFFICE VISIT (OUTPATIENT)
Dept: NEUROLOGY | Facility: CLINIC | Age: 70
End: 2025-05-21
Payer: MEDICARE

## 2025-05-21 VITALS
HEIGHT: 62 IN | BODY MASS INDEX: 39.2 KG/M2 | SYSTOLIC BLOOD PRESSURE: 138 MMHG | DIASTOLIC BLOOD PRESSURE: 88 MMHG | HEART RATE: 86 BPM | OXYGEN SATURATION: 94 % | WEIGHT: 213 LBS

## 2025-05-21 DIAGNOSIS — G62.9 NEUROPATHY: Primary | ICD-10-CM

## 2025-05-21 PROCEDURE — 1159F MED LIST DOCD IN RCRD: CPT | Performed by: STUDENT IN AN ORGANIZED HEALTH CARE EDUCATION/TRAINING PROGRAM

## 2025-05-21 PROCEDURE — 1160F RVW MEDS BY RX/DR IN RCRD: CPT | Performed by: STUDENT IN AN ORGANIZED HEALTH CARE EDUCATION/TRAINING PROGRAM

## 2025-05-21 PROCEDURE — 99213 OFFICE O/P EST LOW 20 MIN: CPT | Performed by: STUDENT IN AN ORGANIZED HEALTH CARE EDUCATION/TRAINING PROGRAM

## 2025-05-21 NOTE — PROGRESS NOTES
Chief Complaint   Patient presents with    Peripheral Neuropathy       Patient ID: Aurelia Rhodes is a 69 y.o. female.    HPI:    The following portions of the patient's history were reviewed and updated as appropriate: allergies, current medications, past family history, past medical history, past social history, past surgical history and problem list.    Interval history:    Review of Systems   Neurological:  Positive for numbness (feet and legs). Negative for dizziness, tremors, seizures, syncope, facial asymmetry, speech difficulty, weakness, light-headedness and headaches.      History of Present Illness  The patient presents to the neurology clinic for a follow-up of neuropathy. The primary reason for this visit is to assess the current status of her symptoms, which she feels are about the same as during her last visit. Since her last appointment, she has seen rheumatology and had additional lab workup. Her SSA antibody was positive once again, but her SSB was negative. Dr. Gardner was not concerned over any specific autoimmune disease diagnosis, and the plan was to continue to monitor for any concerning specific symptoms.    She reports a continued burning sensation or heat in the arch of her right foot, which is more pronounced than in her left foot. This discomfort is more noticeable during the night, although it does not disrupt her sleep. She has not experienced any falls since her last visit. She has been engaging in regular exercise and walking and acknowledges a habit of prolonged sitting while watching television or playing on her phone. She has observed that increased vegetable intake seems to alleviate her symptoms. She is not currently taking pregabalin 50 mg at night, having discontinued it due to perceived lack of efficacy. She did not observe any significant changes in her symptoms upon cessation of the medication. She does not experience dry mouth she clarifies today..         SOCIAL  HISTORY  Alcohol: Does not drink alcohol.  Sleep: Able to go to sleep and does not wake up due to symptoms.            Vitals:    05/21/25 1312   BP: 138/88   Pulse: 86   SpO2: 94%       Neurological Exam  Mental Status  Alert.    Cranial Nerves  CN II: Right normal visual field. Left normal visual field.  CN III, IV, VI: Extraocular movements intact bilaterally. Pupils equal round and reactive to light bilaterally.  CN V:  Right: Facial sensation is normal.  Left: Facial sensation is normal on the left.  CN VII:  Left: There is no facial weakness.  CN IX, X:  Right: Palate is normal.  Left: Palate is normal.  CN XI:  Right: Trapezius strength is normal.  Left: Trapezius strength is normal.  CN XII: Tongue midline without atrophy or fasciculations.  Normal hearing to finger rub bilaterally.    Motor                                               Right                     Left  Deltoid                                   5                          5   Biceps                                   5                          5   Triceps                                  5                          5   Iliopsoas                               5                          5   Quadriceps                           5                          5   Hamstring                             5                          5   Gastrocnemius                     5                           5   Anterior tibialis                      5                          5    Sensory  Light touch is normal in upper and lower extremities.   Decr R toes to vibration, and normal L toes to vibration   .    Reflexes                                            Right                      Left  Brachioradialis                    2+                         2+  Biceps                                 2+                         2+  Patellar                                2+                         2+  Achilles                                2+                          2+    Coordination  Right: Finger-to-nose normal. Heel-to-shin normal.Left: Finger-to-nose normal. Heel-to-shin normal.    Gait    Normal unstressed gait.      Physical Exam  Eyes:      Extraocular Movements: Extraocular movements intact.      Pupils: Pupils are equal, round, and reactive to light.   Neurological:      Mental Status: She is alert.      Deep Tendon Reflexes:      Reflex Scores:       Bicep reflexes are 2+ on the right side and 2+ on the left side.       Brachioradialis reflexes are 2+ on the right side and 2+ on the left side.       Patellar reflexes are 2+ on the right side and 2+ on the left side.       Achilles reflexes are 2+ on the right side and 2+ on the left side.      Procedures    Assessment/Plan:    Assessment & Plan  1. Neuropathy.  Her symptoms have remained stable since the last visit clinically, with a slight improvement noted during today's examination. The burning sensation in her feet is more pronounced at night but does not significantly affect her sleep. She has discontinued pregabalin 50 mg nightly as it did not provide noticeable relief. She has been advised to monitor for any potential triggers or alleviating factors. She has been advised to keep her follow-up appointment with Dr. Gardner in a few months to ensure no further action is required from their standpoint. An EMG nerve conduction study test was discussed as a potential future diagnostic tool, but it is not deemed necessary at this point due to the absence of medication use and the stability of her symptoms. If she experiences balance issues or an exacerbation of the burning sensation or pain, alternative medications can be considered to manage the nerve pain.    Follow-up  The patient will follow up in 1 year, or earlier if necessary.   I spent 20 minutes caring for this patient on this date of service. This time includes time spent by me in the following activities as necessary: preparing for the visit, reviewing  tests, medical records and previous visits, obtaining and/or reviewing a separately obtained history, performing a medically appropriate exam and/or evaluation, counseling and educating the patient, and/or communicating with other healthcare professionals, documenting information in the medical record, independently interpreting results and communicating that information with the patient, and developing a medically appropriate treatment plan with consideration of other conditions, medications, and treatments.    Patient or patient representative verbalized consent for the use of Ambient Listening during the visit with  Conner Colon MD for chart documentation. 5/21/2025  15:27 EDT     Diagnoses and all orders for this visit:    1. Neuropathy (Primary)           Conner Colon MD